# Patient Record
Sex: FEMALE | HISPANIC OR LATINO | Employment: STUDENT | URBAN - METROPOLITAN AREA
[De-identification: names, ages, dates, MRNs, and addresses within clinical notes are randomized per-mention and may not be internally consistent; named-entity substitution may affect disease eponyms.]

---
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
## 2018-07-03 ENCOUNTER — HOSPITAL ENCOUNTER (EMERGENCY)
Dept: HOSPITAL 8 - ED | Age: 18
Discharge: HOME | End: 2018-07-03
Payer: COMMERCIAL

## 2018-07-03 VITALS — BODY MASS INDEX: 25.82 KG/M2 | HEIGHT: 63 IN | WEIGHT: 145.73 LBS

## 2018-07-03 VITALS — SYSTOLIC BLOOD PRESSURE: 123 MMHG | DIASTOLIC BLOOD PRESSURE: 71 MMHG

## 2018-07-03 DIAGNOSIS — R00.2: Primary | ICD-10-CM

## 2018-07-03 LAB
ALBUMIN SERPL-MCNC: 3.6 G/DL (ref 3.4–5)
ANION GAP SERPL CALC-SCNC: 8 MMOL/L (ref 5–15)
BASOPHILS # BLD AUTO: 0.08 X10^3/UL (ref 0–0.3)
BASOPHILS NFR BLD AUTO: 1 % (ref 0–1)
CALCIUM SERPL-MCNC: 9 MG/DL (ref 8.5–10.1)
CHLORIDE SERPL-SCNC: 107 MMOL/L (ref 98–107)
CREAT SERPL-MCNC: 0.73 MG/DL (ref 0.55–1.02)
EOSINOPHIL # BLD AUTO: 0.23 X10^3/UL (ref 0–0.8)
EOSINOPHIL NFR BLD AUTO: 2 % (ref 1–7)
ERYTHROCYTE [DISTWIDTH] IN BLOOD BY AUTOMATED COUNT: 14 % (ref 9.6–15.2)
LYMPHOCYTES # BLD AUTO: 2.51 X10^3/UL (ref 1–6.1)
LYMPHOCYTES NFR BLD AUTO: 20 % (ref 22–44)
MCH RBC QN AUTO: 27.5 PG (ref 27–34.8)
MCHC RBC AUTO-ENTMCNC: 34 G/DL (ref 32.4–35.8)
MCV RBC AUTO: 81.1 FL (ref 80–100)
MD: NO
MONOCYTES # BLD AUTO: 0.84 X10^3/UL (ref 0–1.4)
MONOCYTES NFR BLD AUTO: 7 % (ref 2–9)
NEUTROPHILS # BLD AUTO: 8.76 X10^3/UL (ref 1.8–8)
NEUTROPHILS NFR BLD AUTO: 71 % (ref 42–75)
PLATELET # BLD AUTO: 317 X10^3/UL (ref 130–400)
PMV BLD AUTO: 8.9 FL (ref 7.4–10.4)
RBC # BLD AUTO: 4.62 X10^6/UL (ref 3.82–5.3)

## 2018-07-03 PROCEDURE — 80048 BASIC METABOLIC PNL TOTAL CA: CPT

## 2018-07-03 PROCEDURE — 71046 X-RAY EXAM CHEST 2 VIEWS: CPT

## 2018-07-03 PROCEDURE — 36415 COLL VENOUS BLD VENIPUNCTURE: CPT

## 2018-07-03 PROCEDURE — 85025 COMPLETE CBC W/AUTO DIFF WBC: CPT

## 2018-07-03 PROCEDURE — 82040 ASSAY OF SERUM ALBUMIN: CPT

## 2018-07-03 PROCEDURE — 93005 ELECTROCARDIOGRAM TRACING: CPT

## 2018-07-03 PROCEDURE — 99285 EMERGENCY DEPT VISIT HI MDM: CPT

## 2019-01-31 ENCOUNTER — HOSPITAL ENCOUNTER (EMERGENCY)
Dept: HOSPITAL 8 - ED | Age: 19
Discharge: HOME | End: 2019-01-31
Payer: COMMERCIAL

## 2019-01-31 VITALS — BODY MASS INDEX: 27.1 KG/M2 | HEIGHT: 62 IN | WEIGHT: 147.27 LBS

## 2019-01-31 VITALS — SYSTOLIC BLOOD PRESSURE: 142 MMHG | DIASTOLIC BLOOD PRESSURE: 86 MMHG
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
## 2019-01-31 DIAGNOSIS — S21.012A: Primary | ICD-10-CM
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
## 2019-01-31 PROCEDURE — 99283 EMERGENCY DEPT VISIT LOW MDM: CPT

## 2019-01-31 PROCEDURE — 12001 RPR S/N/AX/GEN/TRNK 2.5CM/<: CPT

## 2019-02-08 ENCOUNTER — OFFICE VISIT (OUTPATIENT)
Dept: MEDICAL GROUP | Facility: MEDICAL CENTER | Age: 19
End: 2019-02-08
Payer: COMMERCIAL

## 2019-02-08 VITALS
WEIGHT: 146.8 LBS | DIASTOLIC BLOOD PRESSURE: 66 MMHG | HEART RATE: 60 BPM | RESPIRATION RATE: 16 BRPM | SYSTOLIC BLOOD PRESSURE: 120 MMHG | BODY MASS INDEX: 26.01 KG/M2 | TEMPERATURE: 98.4 F | HEIGHT: 63 IN | OXYGEN SATURATION: 98 %

## 2019-02-08 DIAGNOSIS — N64.59 NIPPLE PROBLEM: ICD-10-CM

## 2019-02-08 PROCEDURE — 99213 OFFICE O/P EST LOW 20 MIN: CPT | Performed by: PHYSICIAN ASSISTANT

## 2019-02-08 ASSESSMENT — PATIENT HEALTH QUESTIONNAIRE - PHQ9: CLINICAL INTERPRETATION OF PHQ2 SCORE: 0

## 2019-02-08 NOTE — PROGRESS NOTES
"Subjective:      Tona Carmona is a 18 y.o. female who presents with Laceration (pulled nipple piercing out by accident, went to ER, here to check )            HPIPatient had bilat nipples pierced about 4 weeks ago, no complications. One week ago she accidentally snagged the left piercing in the shower door and it tore the nipple. The piercing was still intact. She went to ER and had steri strips placed. No pain, swelling. Here today for check.    ROSno fever/chills. No skin rash. No joint pain or swelling    PMHX: negative for heart or lung disease. 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 She appears well-developed and well-nourished. No distress.   Pulmonary/Chest:   Left breast examined. No erythema, swelling or sign of infection. Steri strips removed. Laceration healing well.   Neurological: She is alert and oriented to person, place, and time.   Skin: Skin is warm and dry. No rash noted. She is not diaphoretic. No pallor.   Psychiatric: She has a normal mood and affect. Her behavior is normal. Judgment and thought content normal.   Vitals reviewed.              Assessment/Plan:     1. Nipple problem  Healing without infection, patient concerned cosmetically about appearance of nipple  - REFERRAL TO PLASTIC SURGERY      "

## 2022-09-19 ENCOUNTER — OFFICE VISIT (OUTPATIENT)
Dept: MEDICAL GROUP | Facility: PHYSICIAN GROUP | Age: 22
End: 2022-09-19
Payer: COMMERCIAL

## 2022-09-19 VITALS
TEMPERATURE: 98.5 F | BODY MASS INDEX: 32.85 KG/M2 | HEART RATE: 61 BPM | OXYGEN SATURATION: 97 % | HEIGHT: 63 IN | SYSTOLIC BLOOD PRESSURE: 110 MMHG | DIASTOLIC BLOOD PRESSURE: 70 MMHG | WEIGHT: 185.4 LBS

## 2022-09-19 DIAGNOSIS — Z12.4 SCREENING FOR CERVICAL CANCER: ICD-10-CM

## 2022-09-19 DIAGNOSIS — R30.0 DYSURIA: ICD-10-CM

## 2022-09-19 DIAGNOSIS — E66.9 OBESITY (BMI 30-39.9): ICD-10-CM

## 2022-09-19 DIAGNOSIS — Z23 NEED FOR HPV VACCINATION: ICD-10-CM

## 2022-09-19 PROBLEM — K42.9 UMBILICAL HERNIA WITHOUT OBSTRUCTION AND WITHOUT GANGRENE: Status: RESOLVED | Noted: 2017-07-20 | Resolved: 2022-09-19

## 2022-09-19 PROCEDURE — 90471 IMMUNIZATION ADMIN: CPT | Performed by: FAMILY MEDICINE

## 2022-09-19 PROCEDURE — 99203 OFFICE O/P NEW LOW 30 MIN: CPT | Mod: 25 | Performed by: FAMILY MEDICINE

## 2022-09-19 PROCEDURE — 90651 9VHPV VACCINE 2/3 DOSE IM: CPT | Performed by: FAMILY MEDICINE

## 2022-09-19 ASSESSMENT — PATIENT HEALTH QUESTIONNAIRE - PHQ9: CLINICAL INTERPRETATION OF PHQ2 SCORE: 0

## 2022-09-19 NOTE — PROGRESS NOTES
"CHIEF COMPLAINT / REASON FOR VISIT  Tona Carmona is a 22 y.o. female that presents today to establish care.    HISTORY OF PRESENT ILLNESS  Dysuria 1-2 weeks ago, took oral oflaxacin (got [facility] [employer]) which resolved her symptoms after a couple of days. Reports UTIs every couple of months, has to take antibiotics every time. Symptoms include dysuria, increased frequency, urgency. Also reports some dyspareunia with episodes. Denies increased or change in vaginal discharge    Since 2018, endorses occasional stress urinary incontinence with coughing/sneezing.     Diet: pescatarian  Exercise: trying to walk more, doesn't do regular exercise    Past Medical History  Recurrent UTIs    Past Surgical History  Umbilical hernia repair 2017    Social History  - Work: accounting/finance  - Family: has boyfriend, family lives locally  - Alcohol: none  - Tobacco/nicotine: none  - Substances: none  - Sex: monogamous with boyfriend, uses condoms for contraception    Medications  none    Family History  Maternal grandmother - ovarian cancer    Objective      /70 (BP Location: Left arm, Patient Position: Sitting, BP Cuff Size: Adult)   Pulse 61   Temp 36.9 °C (98.5 °F) (Temporal)   Ht 1.6 m (5' 3\")   Wt 84.1 kg (185 lb 6.4 oz)   SpO2 97%   BMI 32.84 kg/m²  Body mass index is 32.84 kg/m².  PHYSICAL EXAM  Constitutional: Sitting comfortably, in no acute distress, responds to questions appropriately.  Head: Normocephalic  Eyes:  No conjunctival injection, no scleral icterus, PERRL  Ears: External ear canals clear, TMs pearly grey with visualized bony landmarks and crisp light reflex  Mouth: Oral mucosa moist. Good dentition  Throat: Oropharynx clear without erythema or tonsillar exudates  Neck: No cervical or supraclavicular lymphadenopathy  Heart: Regular S1 S2, no murmurs, rub, or gallops  Lungs: Clear to auscultation bilaterally, no wheezes, rales, or rhonchi  Abdomen: Soft, nontender, nondistended  Extremities: No " lower extremity edema  Skin: Warm and dry, no rashes or lesions on face or exposed upper extremities    ASSESSMENT & PLAN  1. Obesity (BMI 30-39.9)  - Patient identified as having weight management issue.  Appropriate counseling given.    2. Need for HPV vaccination  - Gardasil 9 (first dose in series)    3. Dysuria  Symptoms currently resolved (after taking oral ofloxacin course which she obtained over the counter at [facility]). Urine dipstick negative for glucose, leukocyte esterase, nitrites, blood.  She is coming back later this week for routine Pap smear, at which point we will also test for STIs and yeast infection.    4. Screening for cervical cancer  Patient will return later this week for first Pap smear.      Return in about 1 day (around 9/20/2022) for Pap and STI testing.

## 2022-09-20 ENCOUNTER — HOSPITAL ENCOUNTER (OUTPATIENT)
Facility: MEDICAL CENTER | Age: 22
End: 2022-09-20
Attending: FAMILY MEDICINE
Payer: COMMERCIAL

## 2022-09-20 ENCOUNTER — OFFICE VISIT (OUTPATIENT)
Dept: MEDICAL GROUP | Facility: PHYSICIAN GROUP | Age: 22
End: 2022-09-20
Payer: COMMERCIAL

## 2022-09-20 VITALS
HEIGHT: 63 IN | BODY MASS INDEX: 33.49 KG/M2 | DIASTOLIC BLOOD PRESSURE: 78 MMHG | RESPIRATION RATE: 14 BRPM | HEART RATE: 64 BPM | TEMPERATURE: 98 F | WEIGHT: 189 LBS | SYSTOLIC BLOOD PRESSURE: 116 MMHG | OXYGEN SATURATION: 100 %

## 2022-09-20 DIAGNOSIS — Z30.011 ENCOUNTER FOR INITIAL PRESCRIPTION OF CONTRACEPTIVE PILLS: ICD-10-CM

## 2022-09-20 DIAGNOSIS — Z01.419 PAP SMEAR, LOW-RISK: ICD-10-CM

## 2022-09-20 DIAGNOSIS — Z12.4 SCREENING FOR CERVICAL CANCER: ICD-10-CM

## 2022-09-20 DIAGNOSIS — Z11.3 ROUTINE SCREENING FOR STI (SEXUALLY TRANSMITTED INFECTION): ICD-10-CM

## 2022-09-20 PROCEDURE — 88175 CYTOPATH C/V AUTO FLUID REDO: CPT

## 2022-09-20 PROCEDURE — 99395 PREV VISIT EST AGE 18-39: CPT | Performed by: FAMILY MEDICINE

## 2022-09-20 PROCEDURE — 87624 HPV HI-RISK TYP POOLED RSLT: CPT

## 2022-09-20 PROCEDURE — 99000 SPECIMEN HANDLING OFFICE-LAB: CPT | Performed by: FAMILY MEDICINE

## 2022-09-20 PROCEDURE — 87480 CANDIDA DNA DIR PROBE: CPT

## 2022-09-20 PROCEDURE — 87591 N.GONORRHOEAE DNA AMP PROB: CPT

## 2022-09-20 PROCEDURE — 87660 TRICHOMONAS VAGIN DIR PROBE: CPT

## 2022-09-20 PROCEDURE — 87510 GARDNER VAG DNA DIR PROBE: CPT

## 2022-09-20 PROCEDURE — 87491 CHLMYD TRACH DNA AMP PROBE: CPT

## 2022-09-20 RX ORDER — NORGESTIMATE AND ETHINYL ESTRADIOL 0.25-0.035
1 KIT ORAL DAILY
Qty: 28 TABLET | Refills: 11 | Status: SHIPPED | OUTPATIENT
Start: 2022-09-20

## 2022-09-20 NOTE — PROGRESS NOTES
"CHIEF COMPLAINT / REASON FOR VISIT  Tona Carmona is a 22 y.o. female that presents today for pap smear and contraception initiation    HISTORY OF PRESENT ILLNESS  Patient here for first pap smear. She would also like to start an oral contraceptive. She had previously taken a combined oral contraceptive when she was 16 years old. Did not experience any significant side effects.  Last menstrual period 2.5 weeks ago. No sex at all since LMP. Uses condoms for contraception    Objective      /78 (BP Location: Left arm, Patient Position: Sitting, BP Cuff Size: Adult)   Pulse 64   Temp 36.7 °C (98 °F) (Temporal)   Resp 14   Ht 1.6 m (5' 3\")   Wt 85.7 kg (189 lb)   SpO2 100%   BMI 33.48 kg/m²  Body mass index is 33.48 kg/m².  PHYSICAL EXAM  Constitutional: Sitting comfortably, in no acute distress, responds to questions appropriately.  Pelvic exam:  Perineum: No external lesions are noted, color is symmetrical throughout  Vagina: Mucosa pink and moist with rugae  Cervix: pink, without lesions    Pap was performed and sent to the lab      ASSESSMENT & PLAN  1. Screening for cervical cancer  Pap performed. Will inform patient of results via patient portal  - PAP IG, CT-NG,RFXHPV ALL 16&18    2. Routine screening for STI (sexually transmitted infection)  - PAP IG, CT-NG,RFXHPV ALL 16&18  - VAGINAL PATHOGENS DNA PANEL; Future     3. Encounter for initial prescription of contraceptive pills  After discussion, decided to initiate combined oral contraceptive pill.  Pregnancy is reasonably excluded by absence of sexual activity since last menstrual period.    Follow up as needed      "

## 2022-09-21 LAB
AMBIGUOUS DTTM AMBI4: NORMAL
CANDIDA DNA VAG QL PROBE+SIG AMP: NEGATIVE
G VAGINALIS DNA VAG QL PROBE+SIG AMP: NEGATIVE
T VAGINALIS DNA VAG QL PROBE+SIG AMP: NEGATIVE

## 2022-09-22 LAB
C TRACH DNA GENITAL QL NAA+PROBE: NEGATIVE
CYTOLOGY REG CYTOL: NORMAL
HPV HR 12 DNA CVX QL NAA+PROBE: NEGATIVE
HPV16 DNA SPEC QL NAA+PROBE: NEGATIVE
HPV18 DNA SPEC QL NAA+PROBE: NEGATIVE
N GONORRHOEA DNA GENITAL QL NAA+PROBE: NEGATIVE
SPECIMEN SOURCE: NORMAL
SPECIMEN SOURCE: NORMAL

## 2023-09-22 NOTE — PROGRESS NOTES
"CHIEF COMPLAINT / REASON FOR VISIT  Tona Carmona is a 22 y.o. female that presents today for ***    HISTORY OF PRESENT ILLNESS  ***    Objective      /78 (BP Location: Left arm, Patient Position: Sitting, BP Cuff Size: Adult)   Pulse 64   Temp 36.7 °C (98 °F) (Temporal)   Resp 14   Ht 1.6 m (5' 3\")   Wt 85.7 kg (189 lb)   SpO2 100%   BMI 33.48 kg/m²  Body mass index is 33.48 kg/m².  PHYSICAL EXAM  Constitutional: Sitting comfortably, in no acute distress, responds to questions appropriately.  Eyes:  No conjunctival injection, no scleral icterus, PERRL  Neck: No cervical lymphadenopathy. No JVD  Heart: Regular S1 S2, no murmurs, rub, or gallops  Lungs: Clear to auscultation bilaterally, no wheezes, rales, or rhonchi  Abdomen: Soft, nontender  Extremities: No lower extremity edema  Skin: Warm and dry, no rashes or lesions on face or exposed upper extremities    ASSESSMENT & PLAN  1. Screening for cervical cancer  ***  - PAP IG, CT-NG,RFXHPV ALL 16&18    2. Routine screening for STI (sexually transmitted infection)  ***  - PAP IG, CT-NG,RFXHPV ALL 16&18  - VAGINAL PATHOGENS DNA PANEL; Future       No follow-ups on file.      " Cantharidin Counseling:  I discussed with the patient the risks of Cantharidin including but not limited to pain, redness, burning, itching, and blistering.

## 2024-05-08 ENCOUNTER — DOCUMENTATION (OUTPATIENT)
Dept: HEALTH INFORMATION MANAGEMENT | Facility: OTHER | Age: 24
End: 2024-05-08
Payer: COMMERCIAL